# Patient Record
Sex: MALE | Race: WHITE | Employment: OTHER | URBAN - METROPOLITAN AREA
[De-identification: names, ages, dates, MRNs, and addresses within clinical notes are randomized per-mention and may not be internally consistent; named-entity substitution may affect disease eponyms.]

---

## 2017-11-14 ENCOUNTER — TELEPHONE (OUTPATIENT)
Dept: ORTHOPEDIC SURGERY | Age: 72
End: 2017-11-14

## 2017-11-14 ENCOUNTER — OFFICE VISIT (OUTPATIENT)
Dept: ORTHOPEDIC SURGERY | Age: 72
End: 2017-11-14

## 2017-11-14 VITALS — HEIGHT: 71 IN | BODY MASS INDEX: 31.5 KG/M2 | WEIGHT: 225 LBS

## 2017-11-14 DIAGNOSIS — M25.572 ACUTE LEFT ANKLE PAIN: Primary | ICD-10-CM

## 2017-11-14 PROCEDURE — 99204 OFFICE O/P NEW MOD 45 MIN: CPT | Performed by: ORTHOPAEDIC SURGERY

## 2017-11-14 PROCEDURE — L1902 AFO ANKLE GAUNTLET PRE OTS: HCPCS | Performed by: ORTHOPAEDIC SURGERY

## 2017-11-14 RX ORDER — PANTOPRAZOLE SODIUM 40 MG/1
40 TABLET, DELAYED RELEASE ORAL
COMMUNITY
Start: 2017-10-07

## 2017-11-14 RX ORDER — PREDNISONE 10 MG/1
10 TABLET ORAL
COMMUNITY
Start: 2017-11-06 | End: 2017-11-28 | Stop reason: SDUPTHER

## 2017-11-14 RX ORDER — VALSARTAN 160 MG/1
160 TABLET ORAL
COMMUNITY
Start: 2016-07-11

## 2017-11-14 RX ORDER — TAMSULOSIN HYDROCHLORIDE 0.4 MG/1
0.4 CAPSULE ORAL
COMMUNITY

## 2017-11-14 RX ORDER — PRAVASTATIN SODIUM 20 MG
20 TABLET ORAL
COMMUNITY
Start: 2016-07-11

## 2017-11-14 RX ORDER — DIPHENOXYLATE HYDROCHLORIDE AND ATROPINE SULFATE 2.5; .025 MG/1; MG/1
1 TABLET ORAL
COMMUNITY
Start: 2017-08-09

## 2017-11-14 RX ORDER — ALLOPURINOL 300 MG/1
300 TABLET ORAL
COMMUNITY
Start: 2017-10-23

## 2017-11-14 NOTE — PROGRESS NOTES
are no rashes, ulcerations or lesions. Strength and tone are normal.    Radiology:     X-rays obtained and reviewed in office:  Views 3  Location left ankle  Impression and 2 views of the left foot obtained previously show evidence of a distal fibular avulsion fracture which appears to be healing. He also has hallux rigidus          Assessment :  Left distal fibula avulsion fracture that appears to be healing hallux rigidus from gout    Impression:  No diagnosis found. Office Procedures:  No orders of the defined types were placed in this encounter. Treatment Plan:  I spent 30 minutes with this patient greater than 50% of the time face-to-face discussing treatment options. All questions were answered. I recommended that he treat this with continued allopurinol use of prednisone which he is finishing up now and then a nonsteroidal he'll modify his diet gave him an air sport brace and he'll follow-up with me in a month. Repeat x-rays of the ankle should be obtained. He may need that one time physical therapy visit. He did go to therapy previously but they did not give him any exercises.

## 2017-11-14 NOTE — TELEPHONE ENCOUNTER
11/14/17  [facility] [place]   - NO PRECERT REQUIRED - PER ONLINE AVAILITY  NDS

## 2017-11-28 RX ORDER — PREDNISONE 10 MG/1
TABLET ORAL
Qty: 14 TABLET | Refills: 0 | Status: SHIPPED | OUTPATIENT
Start: 2017-11-28

## 2017-12-12 ENCOUNTER — OFFICE VISIT (OUTPATIENT)
Dept: ORTHOPEDIC SURGERY | Age: 72
End: 2017-12-12

## 2017-12-12 VITALS — WEIGHT: 225.09 LBS | BODY MASS INDEX: 31.51 KG/M2 | HEIGHT: 71 IN

## 2017-12-12 DIAGNOSIS — M10.9 ACUTE GOUT OF LEFT FOOT, UNSPECIFIED CAUSE: ICD-10-CM

## 2017-12-12 DIAGNOSIS — M25.572 LEFT ANKLE PAIN, UNSPECIFIED CHRONICITY: Primary | ICD-10-CM

## 2017-12-12 PROCEDURE — 99212 OFFICE O/P EST SF 10 MIN: CPT | Performed by: ORTHOPAEDIC SURGERY

## 2017-12-12 RX ORDER — INDOMETHACIN 50 MG/1
50 CAPSULE ORAL 3 TIMES DAILY
Qty: 60 CAPSULE | Refills: 3 | Status: SHIPPED | OUTPATIENT
Start: 2017-12-12

## 2017-12-12 NOTE — PROGRESS NOTES
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 The prednisone helped him enourmously. He still has some ankle pain but it's improved. She using the boot on a regular basis. These on ankle for a long period of time he has increased pain. Objective: Physical exam shows mild effusion in the left ankle. There is minimal to no tenderness of the left 1st MTP joint. Less than 20° of MTP extension sensations intact strength is 4/5 dorsiflexion plantarflexion and is felt to the anterior ankle joint  Imaging: 3 views of the left ankle joint show the previous distal fibular avulsion fracture appears to be healing in nicely no evidence of displacement mortise is well reduced hallux rigidus  Assessment and plan: Going to wean out of the boot into a shoe with insert. She'll be an off-the-shelf insert that he can get on his own.   I gave him a prescription for Indocin and he'll follow up with me in 4 weeks of his ankle still painful I would inject

## 2018-01-09 ENCOUNTER — OFFICE VISIT (OUTPATIENT)
Dept: ORTHOPEDIC SURGERY | Age: 73
End: 2018-01-09

## 2018-01-09 VITALS
DIASTOLIC BLOOD PRESSURE: 104 MMHG | HEART RATE: 81 BPM | HEIGHT: 71 IN | SYSTOLIC BLOOD PRESSURE: 170 MMHG | BODY MASS INDEX: 31.51 KG/M2 | WEIGHT: 225.09 LBS

## 2018-01-09 DIAGNOSIS — M10.9 ACUTE GOUT OF LEFT FOOT, UNSPECIFIED CAUSE: Primary | ICD-10-CM

## 2018-01-09 PROCEDURE — 99212 OFFICE O/P EST SF 10 MIN: CPT | Performed by: ORTHOPAEDIC SURGERY

## 2018-01-09 NOTE — PROGRESS NOTES
Subjective: Patient states that he is here for follow-up of his left ankle and great toe gout attack. He states he's 95% better. He is been going without the boot around the house in a regular firm soled shoe. Objective: Physical exam shows swelling is minimal.  Strength is 4/5 in the dorsiflexion plantarflexion. He has approximately 60° of MTP extension with no tenderness and there is no warmth or swelling either at the ankle or the great toe. Imaging:  Assessment and plan:  This patient is doing extremely well I reviewed with him the treatment of gout he'll continue Indocin as needed allopurinol and contact me with any problems

## 2019-10-29 ENCOUNTER — OFFICE VISIT (OUTPATIENT)
Dept: ORTHOPEDIC SURGERY | Age: 74
End: 2019-10-29
Payer: MEDICARE

## 2019-10-29 VITALS — BODY MASS INDEX: 31.51 KG/M2 | HEIGHT: 71 IN | WEIGHT: 225.09 LBS

## 2019-10-29 DIAGNOSIS — T14.8XXA FRACTURE: Primary | ICD-10-CM

## 2019-10-29 PROCEDURE — 27788 TREATMENT OF ANKLE FRACTURE: CPT | Performed by: ORTHOPAEDIC SURGERY

## 2019-10-29 PROCEDURE — 99213 OFFICE O/P EST LOW 20 MIN: CPT | Performed by: ORTHOPAEDIC SURGERY

## 2019-11-19 ENCOUNTER — OFFICE VISIT (OUTPATIENT)
Dept: ORTHOPEDIC SURGERY | Age: 74
End: 2019-11-19
Payer: MEDICARE

## 2019-11-19 VITALS — BODY MASS INDEX: 31.51 KG/M2 | WEIGHT: 225.09 LBS | HEIGHT: 71 IN

## 2019-11-19 DIAGNOSIS — T14.8XXA FRACTURE: Primary | ICD-10-CM

## 2019-11-19 PROCEDURE — L1902 AFO ANKLE GAUNTLET PRE OTS: HCPCS | Performed by: ORTHOPAEDIC SURGERY

## 2019-11-19 PROCEDURE — 99024 POSTOP FOLLOW-UP VISIT: CPT | Performed by: ORTHOPAEDIC SURGERY

## 2024-04-10 NOTE — PROGRESS NOTES
[facility] PRE-OPERATIVE INSTRUCTIONS       DOS: __4/22/24__        Pre-Op Instructions     [x]  A History and Physical will be required within 30 days prior to surgery date. Some patients may require cardiac or pulmonary clearance. H&P will be completed DOS for Endo/colonoscopy patients.     [x]  Reviewed Medical and Surgical history, medication list, confirmed with patient any implants, allergies, bleeding disorders, JODI and reactions to Anesthesia.    [x]  If there is a change in physical condition between now and the day of surgery, please notify your surgeon. This includes a cough, cold, fever, sore throat, nausea, vomiting and diarrhea. Also notify your surgeon if you experience dizziness, shortness of breath or blurred vision.    [x] Reviewed hx of C-Diff, MRSA, VRE and/or recent use of Antibiotics     [x]  All patients having a procedure must have a ride home by a responsible person that is over the age of 18 and ensure it is someone that we can share medical information with. After discharge, a responsible adult needs to stay with you for 24 hours. There is a limit of 2 adult visitors per room.     If unable to secure ride and/or care taker, please contact surgeon's office.      [x]  No alcohol, smoking or marijuana use 24 hours prior to surgery. Any use of recreational drugs must be stopped 5 days prior to surgery.     [x]  NPO after midnight (Any heart, BP, seizure, thyroid and breathing medications are okay to take the morning of surgery with a small sip of water).    The morning of surgery, you may brush your teeth, just no swallowing water. Also, NO gum, candy, mints or ice chips.    [x]  For Colonoscopy's, follow prep-instructions as indicated by physician.     []  Patients with a insulin pump, keep set on basal rate on day of surgery. Long-acting insulin should be administered the evening before, take only half of usual dose. Always check with prescribing doctor if there are any

## 2024-04-22 ENCOUNTER — ANESTHESIA (OUTPATIENT)
Age: 79
End: 2024-04-22
Payer: MEDICARE

## 2024-04-22 ENCOUNTER — HOSPITAL ENCOUNTER (OUTPATIENT)
Age: 79
Setting detail: OUTPATIENT SURGERY
Discharge: HOME | End: 2024-04-22
Attending: INTERNAL MEDICINE | Admitting: INTERNAL MEDICINE
Payer: MEDICARE

## 2024-04-22 ENCOUNTER — ANESTHESIA EVENT (OUTPATIENT)
Age: 79
End: 2024-04-22
Payer: MEDICARE

## 2024-04-22 VITALS
OXYGEN SATURATION: 98 % | RESPIRATION RATE: 18 BRPM | DIASTOLIC BLOOD PRESSURE: 81 MMHG | HEIGHT: 71 IN | WEIGHT: 231 LBS | BODY MASS INDEX: 32.34 KG/M2 | TEMPERATURE: 97.5 F | HEART RATE: 49 BPM | SYSTOLIC BLOOD PRESSURE: 143 MMHG

## 2024-04-22 DIAGNOSIS — R19.4 BOWEL HABIT CHANGES: ICD-10-CM

## 2024-04-22 PROCEDURE — 2580000003 HC RX 258: Performed by: NURSE ANESTHETIST, CERTIFIED REGISTERED

## 2024-04-22 PROCEDURE — 88305 TISSUE EXAM BY PATHOLOGIST: CPT

## 2024-04-22 PROCEDURE — 2580000003 HC RX 258: Performed by: ANESTHESIOLOGY

## 2024-04-22 PROCEDURE — 6360000002 HC RX W HCPCS: Performed by: NURSE ANESTHETIST, CERTIFIED REGISTERED

## 2024-04-22 PROCEDURE — 3700000000 HC ANESTHESIA ATTENDED CARE: Performed by: INTERNAL MEDICINE

## 2024-04-22 PROCEDURE — 3700000001 HC ADD 15 MINUTES (ANESTHESIA): Performed by: INTERNAL MEDICINE

## 2024-04-22 PROCEDURE — 7100000011 HC PHASE II RECOVERY - ADDTL 15 MIN: Performed by: INTERNAL MEDICINE

## 2024-04-22 PROCEDURE — 3609010600 HC COLONOSCOPY POLYPECTOMY SNARE/COLD BIOPSY: Performed by: INTERNAL MEDICINE

## 2024-04-22 PROCEDURE — 7100000010 HC PHASE II RECOVERY - FIRST 15 MIN: Performed by: INTERNAL MEDICINE

## 2024-04-22 PROCEDURE — 2709999900 HC NON-CHARGEABLE SUPPLY: Performed by: INTERNAL MEDICINE

## 2024-04-22 PROCEDURE — 3609010300 HC COLONOSCOPY W/BIOPSY SINGLE/MULTIPLE: Performed by: INTERNAL MEDICINE

## 2024-04-22 RX ORDER — SODIUM CHLORIDE 0.9 % (FLUSH) 0.9 %
5-40 SYRINGE (ML) INJECTION PRN
Status: DISCONTINUED | OUTPATIENT
Start: 2024-04-22 | End: 2024-04-22 | Stop reason: HOSPADM

## 2024-04-22 RX ORDER — SODIUM CHLORIDE 0.9 % (FLUSH) 0.9 %
5-40 SYRINGE (ML) INJECTION EVERY 12 HOURS SCHEDULED
Status: DISCONTINUED | OUTPATIENT
Start: 2024-04-22 | End: 2024-04-22 | Stop reason: HOSPADM

## 2024-04-22 RX ORDER — ONDANSETRON 2 MG/ML
4 INJECTION INTRAMUSCULAR; INTRAVENOUS
Status: DISCONTINUED | OUTPATIENT
Start: 2024-04-22 | End: 2024-04-22 | Stop reason: HOSPADM

## 2024-04-22 RX ORDER — SODIUM CHLORIDE 9 MG/ML
INJECTION, SOLUTION INTRAVENOUS PRN
Status: DISCONTINUED | OUTPATIENT
Start: 2024-04-22 | End: 2024-04-22 | Stop reason: HOSPADM

## 2024-04-22 RX ORDER — PROPOFOL 10 MG/ML
INJECTION, EMULSION INTRAVENOUS PRN
Status: DISCONTINUED | OUTPATIENT
Start: 2024-04-22 | End: 2024-04-22 | Stop reason: SDUPTHER

## 2024-04-22 RX ORDER — SODIUM CHLORIDE 9 MG/ML
INJECTION, SOLUTION INTRAVENOUS CONTINUOUS PRN
Status: DISCONTINUED | OUTPATIENT
Start: 2024-04-22 | End: 2024-04-22 | Stop reason: SDUPTHER

## 2024-04-22 RX ORDER — NALOXONE HYDROCHLORIDE 0.4 MG/ML
INJECTION, SOLUTION INTRAMUSCULAR; INTRAVENOUS; SUBCUTANEOUS PRN
Status: DISCONTINUED | OUTPATIENT
Start: 2024-04-22 | End: 2024-04-22 | Stop reason: HOSPADM

## 2024-04-22 RX ORDER — LIDOCAINE HYDROCHLORIDE 20 MG/ML
INJECTION, SOLUTION INTRAVENOUS PRN
Status: DISCONTINUED | OUTPATIENT
Start: 2024-04-22 | End: 2024-04-22 | Stop reason: SDUPTHER

## 2024-04-22 RX ADMIN — SODIUM CHLORIDE: 9 INJECTION, SOLUTION INTRAVENOUS at 08:30

## 2024-04-22 RX ADMIN — LIDOCAINE HYDROCHLORIDE 60 MG: 20 INJECTION, SOLUTION INTRAVENOUS at 08:35

## 2024-04-22 RX ADMIN — PROPOFOL 160 MCG/KG/MIN: 10 INJECTION, EMULSION INTRAVENOUS at 08:34

## 2024-04-22 RX ADMIN — PROPOFOL 60 MG: 10 INJECTION, EMULSION INTRAVENOUS at 08:35

## 2024-04-22 RX ADMIN — SODIUM CHLORIDE 20 ML/HR: 9 INJECTION, SOLUTION INTRAVENOUS at 07:23

## 2024-04-22 ASSESSMENT — PAIN - FUNCTIONAL ASSESSMENT
PAIN_FUNCTIONAL_ASSESSMENT: 0-10
PAIN_FUNCTIONAL_ASSESSMENT: 0-10

## 2024-04-22 ASSESSMENT — LIFESTYLE VARIABLES: SMOKING_STATUS: 0

## 2024-04-22 ASSESSMENT — ENCOUNTER SYMPTOMS: SHORTNESS OF BREATH: 0

## 2024-04-22 NOTE — ANESTHESIA PRE PROCEDURE
Department of Anesthesiology  Preprocedure Note       Name:  Zeus Cross   Age:  78 y.o.  :  1945                                          MRN:  [id number]         Date:  2024      Surgeon: Surgeon(s):  Som Augustin MD    Procedure: Procedure(s):  COLONOSCOPY    Medications prior to admission:   Prior to Admission medications    Medication Sig Start Date End Date Taking? Authorizing Provider   pravastatin (PRAVACHOL) 20 MG tablet Take 1 tablet by mouth 16   Latonya Marie MD   valsartan (DIOVAN) 160 MG tablet Take 1 tablet by mouth 16   Latonya Marie MD       Current medications:    No current facility-administered medications for this encounter.       Allergies:  No Known Allergies    Problem List:    Patient Active Problem List   Diagnosis Code    Acute gout of left foot M10.9       Past Medical History:        Diagnosis Date    Hyperlipidemia     Hypertension        Past Surgical History:        Procedure Laterality Date    HAMMER TOE SURGERY      KNEE SURGERY Left     PROSTATE SURGERY  2024       Social History:    Social History     Tobacco Use    Smoking status: Former     Types: Cigarettes     Start date: 1987    Smokeless tobacco: Never   Substance Use Topics    Alcohol use: Yes     Comment: occasional                                Counseling given: Not Answered      Vital Signs (Current):   Vitals:    04/10/24 1141 24 0643 24 0645   BP:   (!) 154/90   Pulse:   61   Resp:   18   Temp:  98.4 °F (36.9 °C)    TempSrc:  Temporal    SpO2:   98%   Weight: 106.6 kg (235 lb)  104.8 kg (231 lb)   Height: 1.803 m (5' 11\")  1.803 m (5' 11\")                                              BP Readings from Last 3 Encounters:   24 (!) 154/90   18 (!) 170/104       NPO Status: Time of last liquid consumption: 0800                        Time of last solid consumption: 0500 (water with medication)                        Date of last

## 2024-04-22 NOTE — ANESTHESIA POSTPROCEDURE EVALUATION
Department of Anesthesiology  Postprocedure Note    Patient: Zeus Cross  MRN: [id number]  YOB: 1945  Date of evaluation: 4/22/2024    Procedure Summary       Date: 04/22/24 Room / Location: [address]    Anesthesia Start: 0830 Anesthesia Stop: 0901    Procedures:       COLONOSCOPY BIOPSY      COLONOSCOPY POLYPECTOMY SNARE BIOPSY Diagnosis:       Bowel habit changes      (Bowel habit changes [R19.4])    Surgeons: Som Augustin MD Responsible Provider: Fallon Love MD    Anesthesia Type: MAC ASA Status: 2            Anesthesia Type: No value filed.    Lori Phase I: Lori Score: 10    Lori Phase II: Lori Score: 10    Anesthesia Post Evaluation    Patient location during evaluation: PACU  Patient participation: complete - patient participated  Level of consciousness: awake and alert  Airway patency: patent  Nausea & Vomiting: no nausea and no vomiting  Cardiovascular status: hemodynamically stable  Respiratory status: acceptable  Hydration status: stable  Pain management: adequate    No notable events documented.

## 2024-04-22 NOTE — DISCHARGE INSTRUCTIONS
today- increase activity as tolerated. It is recommended to take a four hour nap after procedure.    Progress slowly to a regular diet unless your physician has instructed you otherwise. Avoid spicy and greasy food on first meal.  Drink plenty of water.    If nausea becomes a problem call your physician.

## 2024-04-22 NOTE — PROGRESS NOTES
Discharge instructions review with patient and wife. All home medications have been reviewed, pt v/u.

## 2024-04-22 NOTE — PROGRESS NOTES
Pt arrived from Endo to preop bay 1. Reported received from Endo, RN/[facility] staff. Pt arousable to voice.Pt on RA, SR, and VSS. Will continue to monitor.

## 2024-04-22 NOTE — H&P
Gastroenterology Note             Pre-operative History and Physical    Patient: Zeus Cross  : 1945  CSN: [id number]    History Obtained From:  patient and/or guardian.     HISTORY OF PRESENT ILLNESS:    The patient is a 78 y.o. male  here for colon cancer screening.      Past Medical History:    Past Medical History:   Diagnosis Date    Hyperlipidemia     Hypertension      Past Surgical History:    Past Surgical History:   Procedure Laterality Date    HAMMER TOE SURGERY  2010    KNEE SURGERY Left 2000    PROSTATE SURGERY  2024     Medications Prior to Admission:   No current facility-administered medications on file prior to encounter.     Current Outpatient Medications on File Prior to Encounter   Medication Sig Dispense Refill    pravastatin (PRAVACHOL) 20 MG tablet Take 1 tablet by mouth      valsartan (DIOVAN) 160 MG tablet Take 1 tablet by mouth          Allergies:  Patient has no known allergies.      Social History:   Social History     Tobacco Use    Smoking status: Former     Types: Cigarettes     Start date: 1987    Smokeless tobacco: Never   Substance Use Topics    Alcohol use: Yes     Comment: occasional     Family History:   History reviewed. No pertinent family history.    PHYSICAL EXAM:      BP (!) 154/90   Pulse 61   Temp 98.4 °F (36.9 °C) (Temporal)   Resp 18   Ht 1.803 m (5' 11\")   Wt 104.8 kg (231 lb)   SpO2 98%   BMI 32.22 kg/m²  I        Heart:   within normal limits    Lungs:  CTA bilat anteriorly,  Normal effort    Abdomen:   soft, NT, ND      ASA Grade:  ASA 2 - Patient with mild systemic disease with no functional limitations    Mallampati Class: 2      ASSESSMENT AND PLAN:    1.  Patient is a 78 y.o. male here for Colonoscopy.   2.  Procedure options, risks and benefits reviewed with patient.  Patient expresses understanding and wishes to proceed.    Som Augustin MD,   [facility]  2024    Please note that some or all of this record was

## 2024-04-23 LAB — SURGICAL PATHOLOGY REPORT: NORMAL

## (undated) DEVICE — FORCEPS BX L240CM WRK CHN 2.8MM STD CAP W/ NDL MIC MESH

## (undated) DEVICE — SYRINGE, LUER SLIP, STERILE, 60ML: Brand: MEDLINE

## (undated) DEVICE — AIR/WATER CLEANING ADAPTER FOR OLYMPUS® GI ENDOSCOPE: Brand: BULLDOG®

## (undated) DEVICE — SOLUTION IRRIG 1000ML STRL H2O USP PLAS POUR BTL

## (undated) DEVICE — TUBING, SUCTION, 1/4" X 12', STRAIGHT: Brand: MEDLINE

## (undated) DEVICE — SNARE COLD DIAMOND 10MM THIN

## (undated) DEVICE — TRAP SPEC POLYP REM STRNR CLN DSGN MAGNIFYING WIND DISP

## (undated) DEVICE — ENDOSCOPIC KIT 1.1+ 6 FT 2 GWN AAMI LEVEL 3

## (undated) DEVICE — SINGLE USE AIR/WATER, SUCTION AND BIOPSY VALVES SET: Brand: ORCAPOD™

## (undated) DEVICE — BW-412T DISP COMBO CLEANING BRUSH: Brand: SINGLE USE COMBINATION CLEANING BRUSH